# Patient Record
Sex: MALE | Race: BLACK OR AFRICAN AMERICAN | NOT HISPANIC OR LATINO | Employment: FULL TIME | URBAN - METROPOLITAN AREA
[De-identification: names, ages, dates, MRNs, and addresses within clinical notes are randomized per-mention and may not be internally consistent; named-entity substitution may affect disease eponyms.]

---

## 2019-02-09 ENCOUNTER — HOSPITAL ENCOUNTER (EMERGENCY)
Facility: HOSPITAL | Age: 24
Discharge: HOME | End: 2019-02-09
Attending: EMERGENCY MEDICINE
Payer: COMMERCIAL

## 2019-02-09 VITALS
TEMPERATURE: 98 F | RESPIRATION RATE: 20 BRPM | HEIGHT: 71 IN | OXYGEN SATURATION: 95 % | HEART RATE: 99 BPM | BODY MASS INDEX: 25.2 KG/M2 | SYSTOLIC BLOOD PRESSURE: 101 MMHG | DIASTOLIC BLOOD PRESSURE: 51 MMHG | WEIGHT: 180 LBS

## 2019-02-09 DIAGNOSIS — M79.601 RIGHT ARM PAIN: ICD-10-CM

## 2019-02-09 DIAGNOSIS — S42.351A CLOSED DISPLACED COMMINUTED FRACTURE OF SHAFT OF RIGHT HUMERUS, INITIAL ENCOUNTER: Primary | ICD-10-CM

## 2019-02-09 PROCEDURE — 99283 EMERGENCY DEPT VISIT LOW MDM: CPT | Mod: 25

## 2019-02-09 RX ORDER — TRAMADOL HYDROCHLORIDE 50 MG/1
25 TABLET ORAL EVERY 6 HOURS PRN
Qty: 6 TABLET | Refills: 0 | Status: SHIPPED | OUTPATIENT
Start: 2019-02-09

## 2019-02-10 NOTE — ED TRIAGE NOTES
"Patient arrived to ED with c/o right arm and right shoulder pain secondary to arm wrestling and heard a "loud pop".  Family at bedside report that they gave him tramadol 100 mg pta.  Patient having difficulty with explaining exactly where pain is coming from and unable to maintain his eyes open due to medication pta.  No acute distress noted.  Crepitus to right arm noted at mid humerus level of right arm.  "

## 2019-02-10 NOTE — ED PROVIDER NOTES
Encounter Date: 2/9/2019       History     Chief Complaint   Patient presents with    Shoulder Injury     heard a pop while arm wrestling, c/o right arm pain. incident occured just PTA. took tramadol PTA.     Chief complaint:  Right upper arm pain    History of present illness:  Patient is a 23-year-old male who just prior to arrival was arm wrestling with a friend when all in the room heard a large pop and the patient felt extreme pain to the right upper arm.  He took 100 mg of tramadol prior to arrival.  He reports any movement of the arm causes worsening pain. Current severity pain is 6/10.  Denies coolness or numbness to the right hand.      The history is provided by the patient, a friend and medical records. No  was used.     Review of patient's allergies indicates:  No Known Allergies  History reviewed. No pertinent past medical history.  Past Surgical History:   Procedure Laterality Date    FRACTURE SURGERY       No family history on file.  Social History     Tobacco Use    Smoking status: Never Smoker   Substance Use Topics    Alcohol use: Yes     Comment: once a week    Drug use: No     Review of Systems   Constitutional: Negative for appetite change, chills, diaphoresis, fatigue and fever.   HENT: Negative for congestion, ear discharge, ear pain, postnasal drip, rhinorrhea, sinus pressure, sneezing, sore throat and voice change.    Eyes: Negative for discharge, itching and visual disturbance.   Respiratory: Negative for cough, shortness of breath and wheezing.    Cardiovascular: Negative for chest pain, palpitations and leg swelling.   Gastrointestinal: Negative for abdominal pain, nausea and vomiting.   Endocrine: Negative for polydipsia, polyphagia and polyuria.   Genitourinary: Negative for difficulty urinating, discharge, dysuria, frequency, hematuria, penile pain, penile swelling and urgency.   Musculoskeletal: Positive for arthralgias. Negative for myalgias.   Skin: Negative  for rash and wound.   Neurological: Negative for dizziness, seizures, syncope and weakness.   Hematological: Negative for adenopathy. Does not bruise/bleed easily.   Psychiatric/Behavioral: Negative for agitation and self-injury. The patient is not nervous/anxious.        Physical Exam     Initial Vitals [02/09/19 2204]   BP Pulse Resp Temp SpO2   (!) 101/51 99 20 97.6 °F (36.4 °C) 95 %      MAP       --         Physical Exam    Nursing note and vitals reviewed.  Constitutional: Vital signs are normal. He appears well-developed and well-nourished. He is not diaphoretic. No distress. He is sedated.   HENT:   Head: Normocephalic and atraumatic.   Right Ear: External ear normal.   Left Ear: External ear normal.   Nose: Nose normal.   Eyes: Pupils are equal, round, and reactive to light. Right eye exhibits no discharge. Left eye exhibits no discharge. No scleral icterus.   Neck: Normal range of motion.   Cardiovascular:   Pulses:       Radial pulses are 2+ on the right side.   Pulmonary/Chest: No respiratory distress.   Abdominal: He exhibits no distension.   Musculoskeletal: Normal range of motion.        Right upper arm: He exhibits tenderness and bony tenderness. He exhibits no swelling, no edema, no deformity and no laceration.   Neurological: He is alert and oriented to person, place, and time.   Skin: Skin is dry. Capillary refill takes less than 2 seconds.         ED Course   Splint Application  Date/Time: 2/10/2019 3:59 AM  Performed by: Stewart Mann DNP  Authorized by: Natan Patterson MD   Consent Done: Not Needed  Location details: right arm  Splint type: sling and swath.  Supplies used: preformed sling and swath.  Post-procedure: The splinted body part was neurovascularly unchanged following the procedure.  Patient tolerance: Patient tolerated the procedure well with no immediate complications  Comments: Pt was neurovascularly intact prior to and post application.        Labs Reviewed - No data to  display       Imaging Results          X-Ray Humerus 2 View Right (Final result)  Result time 02/09/19 23:43:42    Final result by Ariel Warner MD (02/09/19 23:43:42)                 Impression:      Comminuted and displaced midshaft right humerus fracture.      Electronically signed by: Ariel Warner MD  Date:    02/09/2019  Time:    23:43             Narrative:    EXAMINATION:  XR SHOULDER 1 VIEW RIGHT; XR HUMERUS 2 VIEW RIGHT    CLINICAL HISTORY:  rt arm pain;  Pain in right arm    TECHNIQUE:  Two views right humerus and two views right shoulder.    COMPARISON:  None.    FINDINGS:  Displaced and comminuted fracture is noted involving the midshaft of the right humerus with apex lateral angulation and displacement of the distal fracture fragment.  Moderate surrounding soft tissue edema.  Glenohumeral and acromioclavicular alignment is preserved.  No additional acute fracture seen.                               X-Ray Shoulder 1 View Right (Final result)  Result time 02/09/19 23:43:42   Procedure changed from X-Ray Shoulder Complete 2 View Right     Final result by Ariel Warner MD (02/09/19 23:43:42)                 Impression:      Comminuted and displaced midshaft right humerus fracture.      Electronically signed by: Ariel Warner MD  Date:    02/09/2019  Time:    23:43             Narrative:    EXAMINATION:  XR SHOULDER 1 VIEW RIGHT; XR HUMERUS 2 VIEW RIGHT    CLINICAL HISTORY:  rt arm pain;  Pain in right arm    TECHNIQUE:  Two views right humerus and two views right shoulder.    COMPARISON:  None.    FINDINGS:  Displaced and comminuted fracture is noted involving the midshaft of the right humerus with apex lateral angulation and displacement of the distal fracture fragment.  Moderate surrounding soft tissue edema.  Glenohumeral and acromioclavicular alignment is preserved.  No additional acute fracture seen.                                       APC / Resident Notes:   Initial assessment:   23-year-old male with right upper arm pain. Patient is somnolent and initial assessment secondary to pain medications taken prior to arrival.    Differential diagnosis:  Fracture, dislocation, sprain, strain    X-ray of the right humerus indicates a displaced comminuted fracture of the right humeral shaft.    One followup examination patient was awake alert and oriented therefore I discontinued my previous orders for IV fluids, alcohol and drug screen as well as monitoring.  Patient was notified of his fracture.  Phone call was placed to Dr. Griffith, LYUBOVAR report was given.  Dr. Griffith orders were a sling & swath, pain meds, and f/u on Monday in his clinic.  These were done and provided to the patient.      He should return to the ED for any worsening or changes in condition.              ED Course as of Feb 10 0354   Sat Feb 09, 2019 2233 BP: (!) 101/51 [VC]   2233 Temp: 97.6 °F (36.4 °C) [VC]   2233 Temp src: Oral [VC]   2233 Pulse: 99 [VC]   2233 Resp: 20 [VC]   2233 SpO2: 95 % [VC]      ED Course User Index  [VC] Stewart Mann DNP     Clinical Impression:   The primary encounter diagnosis was Closed displaced comminuted fracture of shaft of right humerus, initial encounter. A diagnosis of Right arm pain was also pertinent to this visit.      Disposition:   Disposition: Discharged  Condition: Stable                        Stewart Mann DNP  02/10/19 0359

## 2019-02-10 NOTE — ED NOTES
Bed: 10main  Expected date:   Expected time:   Means of arrival:   Comments:  mookie Garcia RN  02/09/19 0611

## 2019-02-10 NOTE — ED NOTES
Patient moved to room 34 for stretcher due to patient's level of sedation from medication he took prior to arrival.